# Patient Record
Sex: MALE | Race: WHITE | NOT HISPANIC OR LATINO | URBAN - METROPOLITAN AREA
[De-identification: names, ages, dates, MRNs, and addresses within clinical notes are randomized per-mention and may not be internally consistent; named-entity substitution may affect disease eponyms.]

---

## 2018-06-01 ENCOUNTER — EVALUATION (OUTPATIENT)
Dept: PHYSICAL THERAPY | Facility: MEDICAL CENTER | Age: 38
End: 2018-06-01
Payer: COMMERCIAL

## 2018-06-01 DIAGNOSIS — M25.511 RIGHT SHOULDER PAIN, UNSPECIFIED CHRONICITY: ICD-10-CM

## 2018-06-01 DIAGNOSIS — S43.431D TEAR OF RIGHT GLENOID LABRUM, SUBSEQUENT ENCOUNTER: Primary | ICD-10-CM

## 2018-06-01 PROCEDURE — 97161 PT EVAL LOW COMPLEX 20 MIN: CPT | Performed by: PHYSICAL THERAPIST

## 2018-06-01 RX ORDER — AMLODIPINE BESYLATE 10 MG/1
10 TABLET ORAL DAILY
COMMUNITY

## 2018-06-01 NOTE — LETTER
2018    Rosalina Sutherland MD  [address] [address]    Patient: Eben Laws   YOB: 1980   Date of Visit: 2018     Encounter Diagnosis     ICD-10-CM    1  Tear of right glenoid labrum, subsequent encounter S43 431D    2  Right shoulder pain, unspecified chronicity M25 511        Dear Dr Kelsy Elizondo:    Please review the attached Plan of Care from [facility] recent visit  Please verify that you agree therapy should continue by signing the attached document and sending it back to our office  If you have any questions or concerns, please don't hesitate to call  Sincerely,    Nissa Wu PT      Referring Provider:      I certify that I have read the below Plan of Care and certify the need for these services furnished under this plan of treatment while under my care  Rosalina Sutherland MD  [facility]: [phone]          PT Evaluation     Today's date: 2018  Patient name: Eben Laws  : 1980  MRN: [id number]  Referring provider: Bob Vargas MD  Dx:   Encounter Diagnosis     ICD-10-CM    1  Tear of right glenoid labrum, subsequent encounter S43 431D    2  Right shoulder pain, unspecified chronicity M25 511                   Assessment    Assessment details: Pt is a pleasant 45year old male presenting to physical therapy with R shoulder pain  Pt with a + MRI for R shoulder labral tear  Pt would benefit from skilled PT to address his current impairments and return him to pre-morbid function     Understanding of Dx/Px/POC: good   Prognosis: good    Goals  Impairment Goals  - Pt I with initial HEP in 1-2 visits  - Improve ST rhythm to normal in 4-6 weeks  - Increase strength to 5/5 in all affected areas in 4-6 weeks    Functional Goals  - Increase FOTO to at least 79 in 6-8 weeks  - Patient will be independent with comprehensive HEP in 6-8 weeks  - Patient will be able to reach for object from shelf at shoulder height with min reports of difficulty in 2-4 weeks  - Patient will be able to reach for object overhead with min to difficulty in 6-8 weeks  - Patient will be able to lift/carry objects without provocation of symptoms in 6-8 weeks          Plan  Patient would benefit from: skilled physical therapy  Other planned modality interventions: Modalities prn  Planned therapy interventions: manual therapy, neuromuscular re-education, patient education, postural training, strengthening, stretching, therapeutic exercise and home exercise program  Frequency: 2x week  Duration in weeks: 8  Treatment plan discussed with: patient        Subjective Evaluation    History of Present Illness  Mechanism of injury: Pt reports that he first noticed shoulder pain when throwing a football over a year ago  Pts pain began with only bothering him only with throwing and then it gradually started to bother him with everyday activities  He feels the pain the most when his arm is away from his body  The majority of his pain is in his R shoulder blade and chest, although he will feel the pain in his lateral shoulder  Pt also has pain with deep breathing  He notes that on occasion with a direct impact to his R shoulder he has numbness that goes down to his R hand    Pain  No pain reported  Current pain ratin  At best pain ratin  At worst pain ratin  Quality: dull ache    Social Support    Employment status: working ([employer])  Hand dominance: right      Diagnostic Tests  MRI studies: abnormal (labral tear)  Treatments  No previous or current treatments  Patient Goals  Patient goals for therapy: decreased pain, increased strength and increased motion          Objective     Postural Observations    Additional Postural Observation Details  + scapular protraction, anterior tilting and winging L  + scapular protraction and anterior tilting R worse than L    Cervical/Thoracic Screen   Cervical range of motion within normal limits  Thoracic range of motion within normal limits    Neurological Testing     Sensation     Shoulder   Left Shoulder   Intact: light touch    Right Shoulder   Intact: light touch    Active Range of Motion     Additional Active Range of Motion Details  Pt demonstrates full AROM B shoulder flexion and abduction, but demonstrates significant dyskinesis with these movements on the right  Passive Range of Motion   Left Shoulder   External rotation 90°:  100 degrees   Internal rotation 90°:  35 degrees     Right Shoulder   External rotation 90°:  100 degrees   Internal rotation 90°:  40 degrees     Joint Play   Left Shoulder  Joints within functional limits are the anterior capsule, posterior capsule and inferior capsule  Right Shoulder  Joints within functional limits are the anterior capsule, posterior capsule and inferior capsule  Additional Joint Play Details  Pt demonstrated a good end feel with all joint mobility testing  Strength/Myotome Testing     Left Shoulder     Planes of Motion   Flexion: 5   Abduction: 5   External rotation at 0°:  5   Internal rotation at 0°:  5     Right Shoulder     Planes of Motion   Flexion: 5   Abduction: 5   External rotation at 0°:  5   Internal rotation at 0°:  5     Additional Strength Details  Prone horizontal abduction with palm down: R 3/5 L 5/5  Prone horizontal abduction with thumb up: R 3/5 L 5/5  Prone flexion: R 3/5 L 5/5    Tests     Left Shoulder   Negative scapular relocation  Right Shoulder   Positive scapular relocation       Additional Tests Details  - anterior drawer test B      Flowsheet Rows      Most Recent Value   PT/OT G-Codes   Current Score  67   Projected Score  79   FOTO information reviewed  Yes          Precautions: None    Daily Treatment Diary       Exercise Diary  6/1            UBE NV            Scap 4 IP            UE alphabet NV            SL ER NV Prone pro/ret NV

## 2018-06-01 NOTE — PROGRESS NOTES
PT Evaluation     Today's date: 2018  Patient name: Mo Gaviria  : 1980  MRN: [id number]  Referring provider: Nathanael Morales MD  Dx:   Encounter Diagnosis     ICD-10-CM    1  Tear of right glenoid labrum, subsequent encounter S43 431D    2  Right shoulder pain, unspecified chronicity M25 511                   Assessment    Assessment details: Pt is a pleasant 45year old male presenting to physical therapy with R shoulder pain  Pt with a + MRI for R shoulder labral tear  Pt would benefit from skilled PT to address his current impairments and return him to pre-morbid function  Understanding of Dx/Px/POC: good   Prognosis: good    Goals  Impairment Goals  - Pt I with initial HEP in 1-2 visits  - Improve ST rhythm to normal in 4-6 weeks  - Increase strength to 5/5 in all affected areas in 4-6 weeks    Functional Goals  - Increase FOTO to at least 79 in 6-8 weeks  - Patient will be independent with comprehensive HEP in 6-8 weeks  - Patient will be able to reach for object from shelf at shoulder height with min reports of difficulty in 2-4 weeks  - Patient will be able to reach for object overhead with min to difficulty in 6-8 weeks  - Patient will be able to lift/carry objects without provocation of symptoms in 6-8 weeks          Plan  Patient would benefit from: skilled physical therapy  Other planned modality interventions: Modalities prn  Planned therapy interventions: manual therapy, neuromuscular re-education, patient education, postural training, strengthening, stretching, therapeutic exercise and home exercise program  Frequency: 2x week  Duration in weeks: 8  Treatment plan discussed with: patient        Subjective Evaluation    History of Present Illness  Mechanism of injury: Pt reports that he first noticed shoulder pain when throwing a football over a year ago    Pts pain began with only bothering him only with throwing and then it gradually started to bother him with everyday activities  He feels the pain the most when his arm is away from his body  The majority of his pain is in his R shoulder blade and chest, although he will feel the pain in his lateral shoulder  Pt also has pain with deep breathing  He notes that on occasion with a direct impact to his R shoulder he has numbness that goes down to his R hand  Pain  No pain reported  Current pain ratin  At best pain ratin  At worst pain ratin  Quality: dull ache    Social Support    Employment status: working ([employer])  Hand dominance: right      Diagnostic Tests  MRI studies: abnormal (labral tear)  Treatments  No previous or current treatments  Patient Goals  Patient goals for therapy: decreased pain, increased strength and increased motion          Objective     Postural Observations    Additional Postural Observation Details  + scapular protraction, anterior tilting and winging L  + scapular protraction and anterior tilting R worse than L    Cervical/Thoracic Screen   Cervical range of motion within normal limits  Thoracic range of motion within normal limits    Neurological Testing     Sensation     Shoulder   Left Shoulder   Intact: light touch    Right Shoulder   Intact: light touch    Active Range of Motion     Additional Active Range of Motion Details  Pt demonstrates full AROM B shoulder flexion and abduction, but demonstrates significant dyskinesis with these movements on the right  Passive Range of Motion   Left Shoulder   External rotation 90°: 100 degrees   Internal rotation 90°: 35 degrees     Right Shoulder   External rotation 90°: 100 degrees   Internal rotation 90°: 40 degrees     Joint Play   Left Shoulder  Joints within functional limits are the anterior capsule, posterior capsule and inferior capsule  Right Shoulder  Joints within functional limits are the anterior capsule, posterior capsule and inferior capsule       Additional Joint Play Details  Pt demonstrated a good end feel with all joint mobility testing  Strength/Myotome Testing     Left Shoulder     Planes of Motion   Flexion: 5   Abduction: 5   External rotation at 0°: 5   Internal rotation at 0°: 5     Right Shoulder     Planes of Motion   Flexion: 5   Abduction: 5   External rotation at 0°: 5   Internal rotation at 0°: 5     Additional Strength Details  Prone horizontal abduction with palm down: R 3/5 L 5/5  Prone horizontal abduction with thumb up: R 3/5 L 5/5  Prone flexion: R 3/5 L 5/5    Tests     Left Shoulder   Negative scapular relocation  Right Shoulder   Positive scapular relocation       Additional Tests Details  - anterior drawer test B      Flowsheet Rows      Most Recent Value   PT/OT G-Codes   Current Score  67   Projected Score  79   FOTO information reviewed  Yes          Precautions: None    Daily Treatment Diary       Exercise Diary  6/1            UBE NV            Scap 4 IP            UE alphabet NV            SL ER NV            Prone pro/ret NV

## 2018-06-04 ENCOUNTER — TRANSCRIBE ORDERS (OUTPATIENT)
Dept: PHYSICAL THERAPY | Facility: MEDICAL CENTER | Age: 38
End: 2018-06-04

## 2018-06-04 ENCOUNTER — OFFICE VISIT (OUTPATIENT)
Dept: PHYSICAL THERAPY | Facility: MEDICAL CENTER | Age: 38
End: 2018-06-04
Payer: COMMERCIAL

## 2018-06-04 DIAGNOSIS — M25.511 RIGHT SHOULDER PAIN, UNSPECIFIED CHRONICITY: ICD-10-CM

## 2018-06-04 DIAGNOSIS — S43.431D TEAR OF RIGHT GLENOID LABRUM, SUBSEQUENT ENCOUNTER: Primary | ICD-10-CM

## 2018-06-04 PROCEDURE — 97112 NEUROMUSCULAR REEDUCATION: CPT

## 2018-06-04 PROCEDURE — 97110 THERAPEUTIC EXERCISES: CPT

## 2018-06-04 NOTE — PROGRESS NOTES
Daily Note     Today's date: 2018  Patient name: Lianet Rosa  : 1980  MRN: [id number]  Referring provider: Amanda Alexis MD  Dx:   Encounter Diagnosis     ICD-10-CM    1  Tear of right glenoid labrum, subsequent encounter S43 431D    2  Right shoulder pain, unspecified chronicity M25 511                   Subjective: Pt reports no changes since IE and has been compliant w/his hep  Objective: See treatment diary below    Precautions: None    Daily Treatment Diary       Exercise Diary             UBE NV 3F/3B           Scap 4 IP Red  3x10           UE alphabet NV Supine  3x           SL ER NV 3x10           Prone pro/ret NV 3x10                                                                                                                                                                                                                          Assessment: Tolerated treatment well  Patient Pt required a significant amount of cuing w/TB ex's for correct positioning of shoulders and for use of correct mm's  Plan: Continue per plan of care

## 2018-06-11 ENCOUNTER — APPOINTMENT (OUTPATIENT)
Dept: PHYSICAL THERAPY | Facility: MEDICAL CENTER | Age: 38
End: 2018-06-11
Payer: COMMERCIAL

## 2018-06-18 ENCOUNTER — APPOINTMENT (OUTPATIENT)
Dept: PHYSICAL THERAPY | Facility: MEDICAL CENTER | Age: 38
End: 2018-06-18
Payer: COMMERCIAL

## 2018-06-25 ENCOUNTER — APPOINTMENT (OUTPATIENT)
Dept: PHYSICAL THERAPY | Facility: MEDICAL CENTER | Age: 38
End: 2018-06-25
Payer: COMMERCIAL